# Patient Record
(demographics unavailable — no encounter records)

---

## 2024-10-31 NOTE — PHYSICAL EXAM
[Alert] : alert [Normal Voice/Communication] : normal voice/communication [Healthy Appearing] : healthy appearing [No Acute Distress] : no acute distress [Sclera] : the sclera and conjunctiva were normal [Hearing Threshold Finger Rub Not [place]] : hearing was normal [Normal Lips/Gums] : the lips and gums were normal [Oropharynx] : the oropharynx was normal [Normal Appearance] : the appearance of the neck was normal [No Neck Mass] : no neck mass was observed [No Respiratory Distress] : no respiratory distress [No Acc Muscle Use] : no accessory muscle use [Respiration, Rhythm And Depth] : normal respiratory rhythm and effort [Auscultation Breath Sounds / Voice Sounds] : lungs were clear to auscultation bilaterally [Heart Rate And Rhythm] : heart rate was normal and rhythm regular [Normal S1, S2] : normal S1 and S2 [Murmurs] : no murmurs [Bowel Sounds] : normal bowel sounds [Abdomen Tenderness] : non-tender [No Masses] : no abdominal mass palpated [Abdomen Soft] : soft [] : no hepatosplenomegaly [Oriented To Time, Place, And Person] : oriented to person, place, and time

## 2024-10-31 NOTE — HISTORY OF PRESENT ILLNESS
[FreeTextEntry1] : 72yo male with hx colon polyps  Patient with hx colon polyps on prior colonoscopy and due for surveillance colonoscopy Patient is asymptomatic without bleeding or change in bowel habits  He had episode several weeks ago with diarrhea, bloating and discomfort Now improved but symptoms lasted for about a week

## 2024-10-31 NOTE — ASSESSMENT
[FreeTextEntry1] : 72yo male with hx colon poyps  Will check colonoscopy with suprep Risks and benefits of procedure(s) discussed with patient in detail, including but not limited to, perforation, bleeding, reaction to anesthesia, missed lesions.  likely recent viral syndrome vs enteritis

## 2025-03-03 NOTE — PHYSICAL EXAM
[Alert] : alert [Normal Voice/Communication] : normal voice/communication [Healthy Appearing] : healthy appearing [No Acute Distress] : no acute distress [Sclera] : the sclera and conjunctiva were normal [Hearing Threshold Finger Rub Not [place]] : hearing was normal [Normal Lips/Gums] : the lips and gums were normal [Normal Appearance] : the appearance of the neck was normal [No Neck Mass] : no neck mass was observed [No Respiratory Distress] : no respiratory distress [No Acc Muscle Use] : no accessory muscle use [Respiration, Rhythm And Depth] : normal respiratory rhythm and effort [Heart Rate And Rhythm] : heart rate was normal and rhythm regular [Bowel Sounds] : normal bowel sounds [Abdomen Tenderness] : non-tender [No Masses] : no abdominal mass palpated [Abdomen Soft] : soft [] : no hepatosplenomegaly [Abnormal Walk] : normal gait [Oriented To Time, Place, And Person] : oriented to person, place, and time

## 2025-03-04 NOTE — ASSESSMENT
[FreeTextEntry1] : 72-year-old M presenting for evaluation of postprandial nausea, abdominal "gurgling', bowel habit changes.   Plan: # Postprandial nausea # Bowel habit changes: Will obtain H pylori stool testing despite negative bx's on EGD in 2017 as that predated these symptoms. Once specimen dropped off, patient to empirically begin Pantoprazole 40mg trial for 4-6 weeks. Plan to f/u in office to discuss symptoms at this point to monitor progress.  - If symptoms not controlled with this regimen will proceed with EGD.  Pt seen and discussed with MD Vegas.  I, Dr. Vegas, personally performed the evaluation and management (E/M) services for this established patient who presents today with (a) new problem(s)/exacerbation of (an) existing condition(s). That E/M includes conducting the examination, assessing all new/exacerbated conditions, and establishing a new plan of care. Today, my NPP, Cecelia Carney, was here to observe my evaluation and management services for this new problem/exacerbated condition to be followed going forward.

## 2025-03-04 NOTE — REVIEW OF SYSTEMS
[Diarrhea] : diarrhea [Bloating (gassiness)] : bloating [Negative] : Heme/Lymph [Abdominal Pain] : no abdominal pain [Vomiting] : no vomiting [Constipation] : no constipation [Heartburn] : no heartburn [Melena (black stool)] : no melena [Bleeding] : no bleeding [Fecal Incontinence (soiling)] : no fecal incontinence [FreeTextEntry7] : nausea

## 2025-03-04 NOTE — HISTORY OF PRESENT ILLNESS
[de-identified] : 2017 [facility]: [facility] sized [facility] [FreeTextEntry1] : 12/2024: COL [facility]: adenomatous polyps, rpt 5 yrs

## 2025-03-25 NOTE — PHYSICAL EXAM
[Alert] : alert [Normal Voice/Communication] : normal voice/communication [Healthy Appearing] : healthy appearing [Sclera] : the sclera and conjunctiva were normal [Hearing Threshold Finger Rub Not [place]] : hearing was normal [Normal Lips/Gums] : the lips and gums were normal [Normal Appearance] : the appearance of the neck was normal [No Respiratory Distress] : no respiratory distress [Auscultation Breath Sounds / Voice Sounds] : lungs were clear to auscultation bilaterally [Heart Rate And Rhythm] : heart rate was normal and rhythm regular [Normal S1, S2] : normal S1 and S2 [Bowel Sounds] : normal bowel sounds [Abdomen Tenderness] : non-tender [Abdomen Soft] : soft [Abnormal Walk] : normal gait [Normal Color / Pigmentation] : normal skin color and pigmentation [Oriented To Time, Place, And Person] : oriented to person, place, and time

## 2025-03-25 NOTE — PHYSICAL EXAM
[Alert] : alert [Normal Voice/Communication] : normal voice/communication [Healthy Appearing] : healthy appearing [Sclera] : the sclera and conjunctiva were normal [Hearing Threshold Finger Rub Not [place]] : hearing was normal [Normal Lips/Gums] : the lips and gums were normal [Normal Appearance] : the appearance of the neck was normal [No Respiratory Distress] : no respiratory distress [Auscultation Breath Sounds / Voice Sounds] : lungs were clear to auscultation bilaterally [Heart Rate And Rhythm] : heart rate was normal and rhythm regular [Normal S1, S2] : normal S1 and S2 [Bowel Sounds] : normal bowel sounds [Abdomen Tenderness] : non-tender [Abdomen Soft] : soft [Abnormal Walk] : normal gait [Normal Color / Pigmentation] : normal skin color and pigmentation [Oriented To Time, Place, And Person] : oriented to person, place, and time

## 2025-04-02 NOTE — ASSESSMENT
[FreeTextEntry1] : 72-year-old male presents for follow up.   Plan:  GERD: Symptoms still present despite change of daily PPI. Recommend endoscopy for further evaluation PUD vs Gastritis. Suggested adding pepcid OTC for additional relief.  Nausea: Will send zofran for management of symptoms, however if EGD is negative will consider GES.  Bowel changes: Will send for abdominal KUB to r/o evidence of stool burden. If stool burden present will recommend clean out with mag citrate. Then will have pt proceed with supplemental fiber and miralax daily. If not evidence of stool burden, will just recommend daily miralax, but may consider CT for further evaluation.   Pt to call if symptoms worsen. Pt agrees to plan, all questions answered. Seen with Dr. Vegas.   I, Dr. Vegas, personally performed the evaluation and management (E/M) services for this established patient who presents today with (a) new problem(s)/exacerbation of (an) existing condition(s). That E/M includes conducting the examination, assessing all new/exacerbated conditions, and establishing a new plan of care. Today, my NPP, Lindsey Leyva, was here to observe my evaluation and management services for this new problem/exacerbated condition to be followed going forward.

## 2025-04-02 NOTE — HISTORY OF PRESENT ILLNESS
[FreeTextEntry1] : Silvio Baker is a 72-year-old male presents for follow up visit. Pt reports since starting the pantoprazole daily, he feels some relief however refractory GERD still present. Pt also continues to report nausea, increased severity after eating. Bowel movements have not changed, still presents with abdominal discomfort with episodes of intense urgency with belief large bowel movement to follow but still passing pellet like stool. Denies significant straining or overt bleeding such as melena or hematochezia. Denies vomiting, or dysphagia. Denies unintentional weight loss.

## 2025-06-10 NOTE — PHYSICAL EXAM
[Normal Voice/Communication] : normal voice/communication [Alert] : alert [Sclera] : the sclera and conjunctiva were normal [Healthy Appearing] : healthy appearing [Hearing Threshold Finger Rub Not [place]] : hearing was normal [Normal Lips/Gums] : the lips and gums were normal [Normal Appearance] : the appearance of the neck was normal [No Respiratory Distress] : no respiratory distress [Auscultation Breath Sounds / Voice Sounds] : lungs were clear to auscultation bilaterally [Normal S1, S2] : normal S1 and S2 [Heart Rate And Rhythm] : heart rate was normal and rhythm regular [Bowel Sounds] : normal bowel sounds [Abdomen Tenderness] : non-tender [Abdomen Soft] : soft [Abnormal Walk] : normal gait [Normal Color / Pigmentation] : normal skin color and pigmentation [Oriented To Time, Place, And Person] : oriented to person, place, and time

## 2025-06-10 NOTE — REVIEW OF SYSTEMS
[As Noted in HPI] : as noted in HPI [Abdominal Pain] : abdominal pain [Constipation] : constipation [Vomiting] : no vomiting [Diarrhea] : no diarrhea [Heartburn] : heartburn [Melena (black stool)] : no melena [Fecal Incontinence (soiling)] : no fecal incontinence [Bleeding] : no bleeding [Bloating (gassiness)] : no bloating [Negative] : Heme/Lymph

## 2025-06-10 NOTE — HISTORY OF PRESENT ILLNESS
[FreeTextEntry1] : Silvio Baker is a 72-year-old male presents for follow up. Recent endoscopy revealed gastritis and duodenitis, pt on pantoprazole daily. Nausea continues, unable to take zofran, as it worsens constipation. Pt trialed miralax and fiber for management of constipation, but minimal improvement. Periods of fecal urgency continue. Generalized stomach discomfort continue. Last colonoscopy in 2024. Denies significant straining or overt bleeding such as melena or hematochezia. Denies vomiting, or dysphagia. Denies unintentional weight loss.

## 2025-06-10 NOTE — PHYSICAL EXAM
[Alert] : alert [Normal Voice/Communication] : normal voice/communication [Healthy Appearing] : healthy appearing [Sclera] : the sclera and conjunctiva were normal [Normal Lips/Gums] : the lips and gums were normal [Hearing Threshold Finger Rub Not [place]] : hearing was normal [Normal Appearance] : the appearance of the neck was normal [No Respiratory Distress] : no respiratory distress [Auscultation Breath Sounds / Voice Sounds] : lungs were clear to auscultation bilaterally [Heart Rate And Rhythm] : heart rate was normal and rhythm regular [Normal S1, S2] : normal S1 and S2 [Bowel Sounds] : normal bowel sounds [Abdomen Tenderness] : non-tender [Abdomen Soft] : soft [Abnormal Walk] : normal gait [Normal Color / Pigmentation] : normal skin color and pigmentation [Oriented To Time, Place, And Person] : oriented to person, place, and time

## 2025-06-10 NOTE — REVIEW OF SYSTEMS
[As Noted in HPI] : as noted in HPI [Abdominal Pain] : abdominal pain [Vomiting] : no vomiting [Constipation] : constipation [Diarrhea] : no diarrhea [Heartburn] : heartburn [Melena (black stool)] : no melena [Bleeding] : no bleeding [Fecal Incontinence (soiling)] : no fecal incontinence [Bloating (gassiness)] : no bloating [Negative] : Heme/Lymph

## 2025-06-10 NOTE — ASSESSMENT
[FreeTextEntry1] : 72-year-old male presents for follow up.   Plan:  Gastritis/duodenitis: Pt to continue pantoprazole.  Nausea: Sending pt for GES to r/o evidence of gastroparesis. Constipation: Trial patient on linzess 145mcg. Samples given in office.  IBgard also given to assist with management of generalized stomach discomfort.   Pt to have follow up in office in two weeks to discuss next steps.  Pt to call sooner if symptoms worsen. Pt agrees to plan, all questions answered.

## 2025-07-09 NOTE — ADDENDUM
[FreeTextEntry1] : I, Pam Hennessy, acted as a scribe on behalf of Dr. Ga 07/09/2025.   All medical record entries made by the Scribe were at my, Dr. Ga, direction and personally dictated by me on 07/09/2025. I have reviewed the chart and agree that the record accurately reflects my personal performance of the history, physical exam, assessment, and plan. I have also personally directed, reviewed, and agreed with the chart.

## 2025-07-09 NOTE — HISTORY OF PRESENT ILLNESS
[FreeTextEntry1] : IVÁN CHANG is a 72 year old male presenting for follow-up. His recent labs showed a PSA of 0.86 on 7/1/25 and urine studies were unremarkable. Patient is currently taking Finasteride, Tamsulosin, and Tadalafil. He states he does not need any refills at this time.

## 2025-07-09 NOTE — REASON FOR VISIT
Not Available
[Follow-up Visit ___] : a follow-up visit  for [unfilled]
[Follow-up Visit ___] : a follow-up visit  for [unfilled]

## 2025-07-09 NOTE — END OF VISIT
[FreeTextEntry3] : Recommendation: The patient is doing well on current medications. The patient will follow up in 1 year or as needed.

## 2025-07-21 NOTE — PHYSICAL EXAM
[No Lymphadenopathy] : no lymphadenopathy [Thyroid Normal, No Nodules] : the thyroid was normal and there were no nodules present [No Respiratory Distress] : no respiratory distress  [Clear to Auscultation] : lungs were clear to auscultation bilaterally [Normal Rate] : normal rate  [Regular Rhythm] : with a regular rhythm [No Murmur] : no murmur heard [No Edema] : there was no peripheral edema [Normal] : affect was normal and insight and judgment were intact

## 2025-07-21 NOTE — HISTORY OF PRESENT ILLNESS
[FreeTextEntry8] : New patient, to establish care and Acute care visit Last PCP [facility], no records Currently has Shingles. Evaluated by UC and treated with Valtrex PMH DM2, GERD, IBS, OA knee, HTN, BPH, anxiety, insomnia   ALso reports chronic GI symptoms starting on 09/09/24- after eating shrimp, nausea started, recurrent weekly then daily. occurs with or without food, loud gurgles. bowel habits changed from normal, once a day at 2pm, then changed to lose stool, multiple times a day, small quantity and thin Had GI work, normal EGD, colonoscopy gastric emptying- gastroparesis with liquids, not solids urge to go, constipation, diarrhea alternating, mild heartburn. taking Prevacid for years, now taking pantoprazole failed Linzess due to increased lose stool IbGuard helps with cramps and gurgling.

## 2025-07-21 NOTE — HEALTH RISK ASSESSMENT
[Yes] : Yes [2 - 4 times a month (2 pts)] : 2-4 times a month (2 points) [1 or 2 (0 pts)] : 1 or 2 (0 points) [Never (0 pts)] : Never (0 points) [No] : In the past 12 months have you used drugs other than those required for medical reasons? No [0] : 2) Feeling down, depressed, or hopeless: Not at all (0) [PHQ-2 Negative - No further assessment needed] : PHQ-2 Negative - No further assessment needed [Never] : Never [Audit-CScore] : 2 [[id number]] : 0